# Patient Record
Sex: MALE | Race: BLACK OR AFRICAN AMERICAN | NOT HISPANIC OR LATINO | Employment: OTHER | URBAN - METROPOLITAN AREA
[De-identification: names, ages, dates, MRNs, and addresses within clinical notes are randomized per-mention and may not be internally consistent; named-entity substitution may affect disease eponyms.]

---

## 2017-03-02 ENCOUNTER — OFFICE VISIT (OUTPATIENT)
Dept: FAMILY MEDICINE | Facility: CLINIC | Age: 47
End: 2017-03-02
Payer: COMMERCIAL

## 2017-03-02 VITALS
BODY MASS INDEX: 27.43 KG/M2 | SYSTOLIC BLOOD PRESSURE: 118 MMHG | DIASTOLIC BLOOD PRESSURE: 70 MMHG | HEIGHT: 69 IN | HEART RATE: 69 BPM | TEMPERATURE: 99 F | OXYGEN SATURATION: 98 % | WEIGHT: 185.19 LBS

## 2017-03-02 DIAGNOSIS — R22.0 SCALP MASS: Primary | ICD-10-CM

## 2017-03-02 PROCEDURE — 1160F RVW MEDS BY RX/DR IN RCRD: CPT | Mod: S$GLB,,,

## 2017-03-02 PROCEDURE — 99202 OFFICE O/P NEW SF 15 MIN: CPT | Mod: S$GLB,,,

## 2017-03-02 PROCEDURE — 99999 PR PBB SHADOW E&M-NEW PATIENT-LVL III: CPT | Mod: PBBFAC,,,

## 2017-03-02 NOTE — PROGRESS NOTES
"Chief Complaint   Patient presents with    Establish Care     new to pcp       HPI  Ivanasherly Natan Sanchez is a 46 y.o. male who presents to the office to establish primary care, he also wants to be referred for a cyst/knot in the left parietal/occipital area of the scalp.  Patient is generally in very good health, he does smoke, has no other medical issues, takes no medications.      HPI    PAST MEDICAL HISTORY:  History reviewed. No pertinent past medical history.    PAST SURGICAL HISTORY:  History reviewed. No pertinent surgical history.    SOCIAL HISTORY:  Social History     Social History    Marital status:      Spouse name: N/A    Number of children: N/A    Years of education: N/A     Occupational History    Not on file.     Social History Main Topics    Smoking status: Current Every Day Smoker    Smokeless tobacco: Not on file    Alcohol use No    Drug use: Not on file    Sexual activity: Yes     Partners: Female     Other Topics Concern    Not on file     Social History Narrative    .  One child.  Smoker.  Occup:  Multi .  ETOH:  Occasionally.         FAMILY HISTORY:  Family History   Problem Relation Age of Onset    Diabetes Mother     No Known Problems Father        ALLERGIES AND MEDICATIONS: updated and reviewed.  Review of patient's allergies indicates:  No Known Allergies  No current outpatient prescriptions on file.     No current facility-administered medications for this visit.        ROS  Review of Systems   Constitutional: Negative for chills and fever.   Cardiovascular:        No exertional symptoms whatsoever.   Skin:        See history of present illness.       Physical Exam  Vitals:    03/02/17 1106   BP: 118/70   Pulse: 69   Temp: 98.7 °F (37.1 °C)    Body mass index is 27.35 kg/(m^2).  Weight: 84 kg (185 lb 3 oz)   Height: 5' 9" (175.3 cm)     Physical Exam   Constitutional: He appears well-developed and well-nourished.   HENT:   There is a fluctuant area " in the left occipitoparietal area, certainly feels cystic.  There is no drainage of material, nontender.   Psychiatric: He has a normal mood and affect. His behavior is normal.   Vitals reviewed.      Health Maintenance       Date Due Completion Date    Lipid Panel 1972 ---    TETANUS VACCINE 5/12/1990 ---    Pneumococcal PPSV23 (Medium Risk) (1) 5/12/1990 ---    Influenza Vaccine 8/1/2016 ---          Assessment & Plan    1. Scalp mass  This is been present for about 8 months.  I recommended referral to plastic surgery for definitive care.  - Ambulatory referral to Plastic Surgery      No Follow-up on file.

## 2017-03-02 NOTE — MR AVS SNAPSHOT
[facility]  [address]  [place] [address]  Phone: [phone]  Fax: [phone]                  Rigoberto Sanchez   3/2/2017 10:40 AM   Office Visit    Description:  Male : 1970   Provider:  Perry Cruz Jr., MD   Department:  [facility]           Reason for Visit     Establish Care           Diagnoses this Visit        Comments    Scalp mass    -  Primary            To Do List           Goals (5 Years of Data)     None      [facility]     [facility] On Call Nurse Care Line -  Assistance  Registered nurses in the [facility] provide clinical advisement, health education, appointment booking, and other advisory services.  Call for this free service at [phone].             Medications           Message regarding Medications     Verify the changes and/or additions to your medication regime listed below are the same as discussed with your clinician today.  If any of these changes or additions are incorrect, please notify your healthcare provider.             Verify that the below list of medications is an accurate representation of the medications you are currently taking.  If none reported, the list may be blank. If incorrect, please contact your healthcare provider. Carry this list with you in case of emergency.                Clinical Reference Information           Your Vitals Were     BP                   118/70 (BP Location: Left arm, Patient Position: Sitting, BP Method: Manual)           Blood Pressure          Most Recent Value    BP  118/70      Allergies as of 3/2/2017     No Known Allergies      Immunizations Administered on Date of Encounter - 3/2/2017     None      Orders Placed During Today's Visit      Normal Orders This Visit    Ambulatory referral to Plastic Surgery       [facility] Sign-Up     Activating your [facility] account is as easy as 1-2-3!     1) Visit [email or web address], select Sign Up Now, enter this activation  code and your date of birth, then select Next.  [email or web address]  Expires: 4/8/2017  7:46 AM      2) Create a username and password to use when you visit [facility] in the future and select a security question in case you lose your password and select Next.    3) Enter your e-mail address and click Sign Up!    Additional Information  If you have questions, please e-mail [email or web address] or call [phone] to talk to our [facility] staff. Remember, [facility] is NOT to be used for urgent needs. For medical emergencies, dial 911.         Smoking Cessation     If you would like to quit smoking:   You may be eligible for free services if you are a [place] resident and started smoking cigarettes before September 1, 1988.  Call the Smoking Cessation Trust ([facility]) toll free at ([phone] or (443) 624-8300.   Call 0-855-QUIT-NOW if you do not meet the above criteria.            Language Assistance Services     ATTENTION: Language assistance services are available, free of charge. Please call [phone].      ATENCIÓN: Si habla español, tiene a lal disposición servicios gratuitos de asistencia lingüística. Llame al [phone].     CHÚ Ý: N?u b?n nói Ti?ng Vi?t, có các d?ch v? h? tr? ngôn ng? mi?n phí dành cho b?n. G?i s? [phone].         [facility] complies with applicable Federal civil rights laws and does not discriminate on the basis of race, color, national origin, age, disability, or sex.

## 2018-04-22 ENCOUNTER — HOSPITAL ENCOUNTER (EMERGENCY)
Facility: HOSPITAL | Age: 48
Discharge: HOME | End: 2018-04-22
Attending: EMERGENCY MEDICINE
Payer: COMMERCIAL

## 2018-04-22 VITALS
HEART RATE: 56 BPM | RESPIRATION RATE: 20 BRPM | TEMPERATURE: 99 F | DIASTOLIC BLOOD PRESSURE: 74 MMHG | OXYGEN SATURATION: 96 % | SYSTOLIC BLOOD PRESSURE: 113 MMHG

## 2018-04-22 DIAGNOSIS — N23 RENAL COLIC ON LEFT SIDE: Primary | ICD-10-CM

## 2018-04-22 DIAGNOSIS — R10.9 LEFT FLANK PAIN: ICD-10-CM

## 2018-04-22 DIAGNOSIS — N20.0 KIDNEY STONE ON LEFT SIDE: ICD-10-CM

## 2018-04-22 LAB
ALBUMIN SERPL-MCNC: 4 G/DL (ref 3.3–5.5)
ALP SERPL-CCNC: 59 U/L (ref 42–141)
BILIRUB SERPL-MCNC: 0.4 MG/DL (ref 0.2–1.6)
BILIRUBIN, POC UA: NEGATIVE
BLOOD, POC UA: ABNORMAL
BUN SERPL-MCNC: 9 MG/DL (ref 7–22)
CALCIUM SERPL-MCNC: 9.7 MG/DL (ref 8–10.3)
CHLORIDE SERPL-SCNC: 103 MMOL/L (ref 98–108)
CLARITY, POC UA: ABNORMAL
COLOR, POC UA: ABNORMAL
CREAT SERPL-MCNC: 1.2 MG/DL (ref 0.6–1.2)
GLUCOSE SERPL-MCNC: 120 MG/DL (ref 73–118)
GLUCOSE, POC UA: NEGATIVE
KETONES, POC UA: NEGATIVE
LEUKOCYTE EST, POC UA: NEGATIVE
NITRITE, POC UA: NEGATIVE
PH UR STRIP: 7 [PH]
POC ALT (SGPT): 14 U/L (ref 10–47)
POC AST (SGOT): 25 U/L (ref 11–38)
POC TCO2: 24 MMOL/L (ref 18–33)
POTASSIUM BLD-SCNC: 4.1 MMOL/L (ref 3.6–5.1)
PROTEIN, POC UA: ABNORMAL
PROTEIN, POC: 7.2 G/DL (ref 6.4–8.1)
SODIUM BLD-SCNC: 143 MMOL/L (ref 128–145)
SPECIFIC GRAVITY, POC UA: 1.02
UROBILINOGEN, POC UA: 0.2 E.U./DL

## 2018-04-22 PROCEDURE — 96375 TX/PRO/DX INJ NEW DRUG ADDON: CPT

## 2018-04-22 PROCEDURE — 80053 COMPREHEN METABOLIC PANEL: CPT

## 2018-04-22 PROCEDURE — 85025 COMPLETE CBC W/AUTO DIFF WBC: CPT

## 2018-04-22 PROCEDURE — 25000003 PHARM REV CODE 250: Performed by: EMERGENCY MEDICINE

## 2018-04-22 PROCEDURE — 99284 EMERGENCY DEPT VISIT MOD MDM: CPT | Mod: 25

## 2018-04-22 PROCEDURE — 96361 HYDRATE IV INFUSION ADD-ON: CPT

## 2018-04-22 PROCEDURE — 81003 URINALYSIS AUTO W/O SCOPE: CPT

## 2018-04-22 PROCEDURE — 96374 THER/PROPH/DIAG INJ IV PUSH: CPT

## 2018-04-22 PROCEDURE — 63600175 PHARM REV CODE 636 W HCPCS: Performed by: EMERGENCY MEDICINE

## 2018-04-22 RX ORDER — OXYCODONE AND ACETAMINOPHEN 5; 325 MG/1; MG/1
1 TABLET ORAL EVERY 4 HOURS PRN
Qty: 10 TABLET | Refills: 0 | Status: SHIPPED | OUTPATIENT
Start: 2018-04-22 | End: 2018-04-24 | Stop reason: SDUPTHER

## 2018-04-22 RX ORDER — TAMSULOSIN HYDROCHLORIDE 0.4 MG/1
0.4 CAPSULE ORAL DAILY
Qty: 10 CAPSULE | Refills: 0 | Status: SHIPPED | OUTPATIENT
Start: 2018-04-22 | End: 2019-04-22

## 2018-04-22 RX ORDER — ONDANSETRON 2 MG/ML
4 INJECTION INTRAMUSCULAR; INTRAVENOUS
Status: COMPLETED | OUTPATIENT
Start: 2018-04-22 | End: 2018-04-22

## 2018-04-22 RX ORDER — TAMSULOSIN HYDROCHLORIDE 0.4 MG/1
0.4 CAPSULE ORAL
Status: COMPLETED | OUTPATIENT
Start: 2018-04-22 | End: 2018-04-22

## 2018-04-22 RX ORDER — KETOROLAC TROMETHAMINE 30 MG/ML
30 INJECTION, SOLUTION INTRAMUSCULAR; INTRAVENOUS
Status: COMPLETED | OUTPATIENT
Start: 2018-04-22 | End: 2018-04-22

## 2018-04-22 RX ORDER — HYDROMORPHONE HYDROCHLORIDE 2 MG/ML
1 INJECTION, SOLUTION INTRAMUSCULAR; INTRAVENOUS; SUBCUTANEOUS
Status: COMPLETED | OUTPATIENT
Start: 2018-04-22 | End: 2018-04-22

## 2018-04-22 RX ORDER — ONDANSETRON 4 MG/1
4 TABLET, ORALLY DISINTEGRATING ORAL EVERY 8 HOURS PRN
Qty: 14 TABLET | Refills: 1 | Status: SHIPPED | OUTPATIENT
Start: 2018-04-22 | End: 2018-04-24 | Stop reason: CLARIF

## 2018-04-22 RX ADMIN — ONDANSETRON 4 MG: 2 INJECTION INTRAMUSCULAR; INTRAVENOUS at 11:04

## 2018-04-22 RX ADMIN — KETOROLAC TROMETHAMINE 30 MG: 30 INJECTION, SOLUTION INTRAMUSCULAR at 11:04

## 2018-04-22 RX ADMIN — HYDROMORPHONE HYDROCHLORIDE 1 MG: 2 INJECTION INTRAMUSCULAR; INTRAVENOUS; SUBCUTANEOUS at 12:04

## 2018-04-22 RX ADMIN — SODIUM CHLORIDE 1000 ML: 0.9 INJECTION, SOLUTION INTRAVENOUS at 11:04

## 2018-04-22 RX ADMIN — TAMSULOSIN HYDROCHLORIDE 0.4 MG: 0.4 CAPSULE ORAL at 12:04

## 2018-04-22 NOTE — ED PROVIDER NOTES
"Encounter Date: 4/22/2018       History     Chief Complaint   Patient presents with    Flank Pain     patient reports having severe left sided flank pain X2 hours.  patient reports he has a history of kidney stones     Chief complaint: Back pain  47-year-old with a known history of kidney stones in the past complains of "pain" to his left flank area that began at 9:30 this morning and has been constant.  The intensity waxes and wanes.  Patient cannot elicit any aggravating or alleviating factors for the pain.  He rates his pain as 10 out of 10.  He does not take anything for the symptoms.  No associated symptoms such as nausea, vomiting, or urinary difficulties.      The history is provided by the patient and the spouse.     Review of patient's allergies indicates:  No Known Allergies  No past medical history on file.  No past surgical history on file.  Family History   Problem Relation Age of Onset    Diabetes Mother     No Known Problems Father      Social History   Substance Use Topics    Smoking status: Current Every Day Smoker    Smokeless tobacco: Not on file    Alcohol use No     Review of Systems   Constitutional: Negative for fever.   Respiratory: Negative for shortness of breath.    Cardiovascular: Negative for chest pain.   Gastrointestinal: Negative for nausea and vomiting.   Genitourinary: Positive for flank pain. Negative for decreased urine volume.   Musculoskeletal: Positive for back pain.   Neurological: Positive for dizziness and weakness.   All other systems reviewed and are negative.      Physical Exam     Initial Vitals [04/22/18 1101]   BP Pulse Resp Temp SpO2   (!) 157/95 70 20 98.7 °F (37.1 °C) 100 %      MAP       115.67         Physical Exam    Constitutional: He appears well-developed and well-nourished. He appears distressed.   HENT:   Head: Normocephalic and atraumatic.   Eyes: EOM are normal. Pupils are equal, round, and reactive to light.   Neck: Neck supple.   Cardiovascular: " Normal rate, regular rhythm and normal heart sounds.   Pulmonary/Chest: Breath sounds normal.   Abdominal: Soft. There is no tenderness. There is no rebound and no guarding.   Musculoskeletal: Normal range of motion.   Neurological: He is alert and oriented to person, place, and time. No cranial nerve deficit.   Skin: Skin is warm and dry.   Psychiatric: He has a normal mood and affect.         ED Course   Procedures  Labs Reviewed   POCT URINALYSIS W/O SCOPE   POCT URINALYSIS W/O SCOPE   POCT CBC   POCT CMP             Medical Decision Making:   Initial Assessment:   47-year-old with a known history of kidney stones presents with pain to his left flank which is similar to the pain past.  On exam patient appears in moderate distress.  He has no CVA tenderness or abdominal tenderness.  His blood pressure is elevated.  ED Management:  Patient was given Toradol with some improvement of his pain.  The pain returned so he was given hydromorphone.  Patient is also given IV fluids and Flomax.  CT of the abdomen and pelvis was ordered.  Labs showed no significant abnormalities including normal white blood cell count  And normal kidney function.    Patient CT does show a  2 millimeter proximal left kidney stone.  There is no infection in the urine.  Patient's pain resolved after Toradol and Dilaudid.  He was also given a Flomax.  Patient was discharged on Percocet, Flomax and Zofran.  He will be referred to urology and was given a urine strainer.                      Clinical Impression:   The primary encounter diagnosis was Renal colic on left side. Diagnoses of Left flank pain and Kidney stone on left side were also pertinent to this visit.                           Alyssia Fink MD  04/22/18 5326

## 2018-04-24 ENCOUNTER — OFFICE VISIT (OUTPATIENT)
Dept: UROLOGY | Facility: CLINIC | Age: 48
End: 2018-04-24
Payer: COMMERCIAL

## 2018-04-24 VITALS
SYSTOLIC BLOOD PRESSURE: 112 MMHG | BODY MASS INDEX: 27.49 KG/M2 | WEIGHT: 185.63 LBS | RESPIRATION RATE: 14 BRPM | HEART RATE: 68 BPM | DIASTOLIC BLOOD PRESSURE: 62 MMHG | HEIGHT: 69 IN

## 2018-04-24 DIAGNOSIS — N13.2 HYDRONEPHROSIS WITH URINARY OBSTRUCTION DUE TO URETERAL CALCULUS: ICD-10-CM

## 2018-04-24 DIAGNOSIS — N20.0 NEPHROLITHIASIS: Primary | ICD-10-CM

## 2018-04-24 PROCEDURE — 99244 OFF/OP CNSLTJ NEW/EST MOD 40: CPT | Mod: S$GLB,,, | Performed by: UROLOGY

## 2018-04-24 PROCEDURE — 99999 PR PBB SHADOW E&M-EST. PATIENT-LVL III: CPT | Mod: PBBFAC,,, | Performed by: UROLOGY

## 2018-04-24 RX ORDER — ONDANSETRON 8 MG/1
TABLET, ORALLY DISINTEGRATING ORAL
Refills: 1 | COMMUNITY
Start: 2018-04-22

## 2018-04-24 RX ORDER — OXYCODONE AND ACETAMINOPHEN 5; 325 MG/1; MG/1
1 TABLET ORAL EVERY 4 HOURS PRN
Qty: 25 TABLET | Refills: 0 | Status: SHIPPED | OUTPATIENT
Start: 2018-04-24

## 2018-04-24 NOTE — PROGRESS NOTES
"Subjective:       Rigoberto Sanchez is a 47 y.o. male who is a new patient who was referred by Dr Fink (ED) for evaluation of nephrolithiasis.      He was seen in ED (4/22/18) with c/o L flank pain. He has h/o kidney stones - passed two stones previously. Last stone was 7-8 years ago, prior to that 20 years. CT showed a 2mm L proximal ureteral stone, also small b/l non-obstructing stones. UA was clear at that time. +n/v at that time.     He continues to have pain. Taking Percocet with relief. No further n/v. Pain in same location.       The following portions of the patient's history were reviewed and updated as appropriate: allergies, current medications, past family history, past medical history, past social history, past surgical history and problem list.    Review of Systems  Constitutional: no fever or chills  ENT: no nasal congestion or sore throat  Respiratory: no cough or shortness of breath  Cardiovascular: no chest pain or palpitations  Gastrointestinal: no nausea or vomiting, tolerating diet  Genitourinary: as per HPI  Hematologic/Lymphatic: no easy bruising or lymphadenopathy  Musculoskeletal: no arthralgias or myalgias  Skin: no rashes or lesions  Neurological: no seizures or tremors  Behavioral/Psych: no auditory or visual hallucinations       Objective:    Vitals: /62   Pulse 68   Resp 14   Ht 5' 9" (1.753 m)   Wt 84.2 kg (185 lb 10 oz)   BMI 27.41 kg/m²     Physical Exam   General: well developed, well nourished in no acute distress  Head: normocephalic, atraumatic  Neck: supple, trachea midline, no obvious enlargement of thyroid  HEENT: EOMI, mucus membranes moist, sclera anicteric, no hearing impairment  Lungs: symmetric expansion, non-labored breathing  Cardiovascular: regular rate and rhythm, normal pulses  Abdomen: soft, non tender, non distended, no palpable masses, no hepatosplenomegaly, no hernias, bladder nonpalpable. No CVA tenderness.  Musculoskeletal: no peripheral edema, " normal ROM in bilateral upper and lower extremities  Lymphatics: no cervical or inguinal lymphadenopathy  Skin: no rashes or lesions  Neuro: alert and oriented x 3, no gross deficits  Psych: normal judgment and insight, normal mood/affect and non-anxious  Genitourinary:   patient declined exam   SARBJIT: patient declined exam      Lab Review   Urine analysis today in clinic shows +blood 50    Lab Results   Component Value Date    WBC 9.33 06/16/2009    HGB 14.7 06/16/2009    HCT 42.5 06/16/2009    MCV 85.5 06/16/2009     06/16/2009     Lab Results   Component Value Date    CREATININE 1.2 06/16/2009    BUN 18 06/16/2009     No results found for: PSA  No results found for: PSADIAG    Imaging  CT reviewed  Reports and images were personally reviewed by me and discussed with the patient today         Assessment/Plan:      1. Nephrolithiasis    - Prior history   - 2mm L proximal ureteral stone   - Discussed red flags to go to ER: fever, inability to tolerate PO, pain not controlled by medications.   - Desires MET. Declines procedure.    - Continue Flomax   - Percocet #25 given    - RADHA in 4 weeks if stone not caught with strainer       2. Hydronephrosis with urinary obstruction due to ureteral calculus    - 2/2 2mm prox L ureteral stone       Follow up in 12 months with KUB

## 2021-05-14 ENCOUNTER — OFFICE VISIT (OUTPATIENT)
Dept: DERMATOLOGY | Facility: CLINIC | Age: 51
End: 2021-05-14
Payer: COMMERCIAL

## 2021-05-14 DIAGNOSIS — L70.0 COMEDONAL ACNE: ICD-10-CM

## 2021-05-14 DIAGNOSIS — D48.5 NEOPLASM OF UNCERTAIN BEHAVIOR OF SKIN: Primary | ICD-10-CM

## 2021-05-14 DIAGNOSIS — L72.0 EIC (EPIDERMAL INCLUSION CYST): ICD-10-CM

## 2021-05-14 DIAGNOSIS — B07.8 OTHER VIRAL WARTS: ICD-10-CM

## 2021-05-14 DIAGNOSIS — L28.0 LSC (LICHEN SIMPLEX CHRONICUS): ICD-10-CM

## 2021-05-14 DIAGNOSIS — L91.8 ST (SKIN TAG): ICD-10-CM

## 2021-05-14 PROCEDURE — 99999 PR PBB SHADOW E&M-EST. PATIENT-LVL II: ICD-10-PCS | Mod: PBBFAC,,, | Performed by: DERMATOLOGY

## 2021-05-14 PROCEDURE — 11102 TANGNTL BX SKIN SINGLE LES: CPT | Mod: S$GLB,,, | Performed by: DERMATOLOGY

## 2021-05-14 PROCEDURE — 1126F PR PAIN SEVERITY QUANTIFIED, NO PAIN PRESENT: ICD-10-PCS | Mod: S$GLB,,, | Performed by: DERMATOLOGY

## 2021-05-14 PROCEDURE — 88304 TISSUE EXAM BY PATHOLOGIST: CPT | Performed by: PATHOLOGY

## 2021-05-14 PROCEDURE — 88305 TISSUE EXAM BY PATHOLOGIST: CPT | Mod: 26,,, | Performed by: PATHOLOGY

## 2021-05-14 PROCEDURE — 99203 PR OFFICE/OUTPT VISIT, NEW, LEVL III, 30-44 MIN: ICD-10-PCS | Mod: 25,S$GLB,, | Performed by: DERMATOLOGY

## 2021-05-14 PROCEDURE — 17110 DESTRUCTION B9 LES UP TO 14: CPT | Mod: 59,S$GLB,, | Performed by: DERMATOLOGY

## 2021-05-14 PROCEDURE — 99999 PR PBB SHADOW E&M-EST. PATIENT-LVL II: CPT | Mod: PBBFAC,,, | Performed by: DERMATOLOGY

## 2021-05-14 PROCEDURE — 1126F AMNT PAIN NOTED NONE PRSNT: CPT | Mod: S$GLB,,, | Performed by: DERMATOLOGY

## 2021-05-14 PROCEDURE — 11102 PR TANGENTIAL BIOPSY, SKIN, SINGLE LESION: ICD-10-PCS | Mod: S$GLB,,, | Performed by: DERMATOLOGY

## 2021-05-14 PROCEDURE — 99203 OFFICE O/P NEW LOW 30 MIN: CPT | Mod: 25,S$GLB,, | Performed by: DERMATOLOGY

## 2021-05-14 PROCEDURE — 17110 PR DESTRUCTION BENIGN LESIONS UP TO 14: ICD-10-PCS | Mod: 59,S$GLB,, | Performed by: DERMATOLOGY

## 2021-05-14 PROCEDURE — 88305 TISSUE EXAM BY PATHOLOGIST: ICD-10-PCS | Mod: 26,,, | Performed by: PATHOLOGY

## 2021-05-14 RX ORDER — TRETINOIN 0.25 MG/G
GEL TOPICAL
Qty: 45 G | Refills: 3 | Status: SHIPPED | OUTPATIENT
Start: 2021-05-14

## 2021-05-19 LAB
FINAL PATHOLOGIC DIAGNOSIS: NORMAL
GROSS: NORMAL
Lab: NORMAL
MICROSCOPIC EXAM: NORMAL

## 2021-06-02 ENCOUNTER — TELEPHONE (OUTPATIENT)
Dept: DERMATOLOGY | Facility: CLINIC | Age: 51
End: 2021-06-02

## 2021-07-26 ENCOUNTER — TELEPHONE (OUTPATIENT)
Dept: DERMATOLOGY | Facility: CLINIC | Age: 51
End: 2021-07-26

## 2021-07-30 ENCOUNTER — PROCEDURE VISIT (OUTPATIENT)
Dept: DERMATOLOGY | Facility: CLINIC | Age: 51
End: 2021-07-30
Payer: COMMERCIAL

## 2021-07-30 DIAGNOSIS — D48.5 NEOPLASM OF UNCERTAIN BEHAVIOR OF SKIN: Primary | ICD-10-CM

## 2021-07-30 PROCEDURE — 99499 UNLISTED E&M SERVICE: CPT | Mod: S$GLB,,, | Performed by: DERMATOLOGY

## 2021-07-30 PROCEDURE — 11104 PR PUNCH BIOPSY, SKIN, SINGLE LESION: ICD-10-PCS | Mod: S$GLB,,, | Performed by: DERMATOLOGY

## 2021-07-30 PROCEDURE — 11105 PUNCH BX SKIN EA SEP/ADDL: CPT | Mod: S$GLB,,, | Performed by: DERMATOLOGY

## 2021-07-30 PROCEDURE — 88305 TISSUE EXAM BY PATHOLOGIST: CPT | Mod: 26,,, | Performed by: PATHOLOGY

## 2021-07-30 PROCEDURE — 88305 TISSUE EXAM BY PATHOLOGIST: CPT | Performed by: PATHOLOGY

## 2021-07-30 PROCEDURE — 88305 TISSUE EXAM BY PATHOLOGIST: ICD-10-PCS | Mod: 26,,, | Performed by: PATHOLOGY

## 2021-07-30 PROCEDURE — 88304 TISSUE EXAM BY PATHOLOGIST: CPT | Performed by: PATHOLOGY

## 2021-07-30 PROCEDURE — 99499 NO LOS: ICD-10-PCS | Mod: S$GLB,,, | Performed by: DERMATOLOGY

## 2021-07-30 PROCEDURE — 11104 PUNCH BX SKIN SINGLE LESION: CPT | Mod: S$GLB,,, | Performed by: DERMATOLOGY

## 2021-07-30 PROCEDURE — 11105 PR PUNCH BIOPSY, SKIN, EA ADDTL LESION: ICD-10-PCS | Mod: S$GLB,,, | Performed by: DERMATOLOGY

## 2021-08-02 ENCOUNTER — TELEPHONE (OUTPATIENT)
Dept: DERMATOLOGY | Facility: CLINIC | Age: 51
End: 2021-08-02

## 2021-08-04 LAB
FINAL PATHOLOGIC DIAGNOSIS: NORMAL
GROSS: NORMAL
Lab: NORMAL
MICROSCOPIC EXAM: NORMAL

## 2023-01-17 ENCOUNTER — OFFICE VISIT (OUTPATIENT)
Dept: UROLOGY | Facility: CLINIC | Age: 53
End: 2023-01-17
Payer: COMMERCIAL

## 2023-01-17 VITALS
HEART RATE: 103 BPM | BODY MASS INDEX: 28.85 KG/M2 | WEIGHT: 194.75 LBS | DIASTOLIC BLOOD PRESSURE: 93 MMHG | SYSTOLIC BLOOD PRESSURE: 147 MMHG | HEIGHT: 69 IN

## 2023-01-17 DIAGNOSIS — N45.1 EPIDIDYMITIS, LEFT: Primary | ICD-10-CM

## 2023-01-17 DIAGNOSIS — R86.8 DISCOLORED SEMEN: ICD-10-CM

## 2023-01-17 PROCEDURE — 3077F PR MOST RECENT SYSTOLIC BLOOD PRESSURE >= 140 MM HG: ICD-10-PCS | Mod: CPTII,S$GLB,, | Performed by: UROLOGY

## 2023-01-17 PROCEDURE — 1159F PR MEDICATION LIST DOCUMENTED IN MEDICAL RECORD: ICD-10-PCS | Mod: CPTII,S$GLB,, | Performed by: UROLOGY

## 2023-01-17 PROCEDURE — 99204 PR OFFICE/OUTPT VISIT, NEW, LEVL IV, 45-59 MIN: ICD-10-PCS | Mod: S$GLB,,, | Performed by: UROLOGY

## 2023-01-17 PROCEDURE — 3008F PR BODY MASS INDEX (BMI) DOCUMENTED: ICD-10-PCS | Mod: CPTII,S$GLB,, | Performed by: UROLOGY

## 2023-01-17 PROCEDURE — 81002 URINALYSIS NONAUTO W/O SCOPE: CPT | Mod: S$GLB,,, | Performed by: UROLOGY

## 2023-01-17 PROCEDURE — 3080F PR MOST RECENT DIASTOLIC BLOOD PRESSURE >= 90 MM HG: ICD-10-PCS | Mod: CPTII,S$GLB,, | Performed by: UROLOGY

## 2023-01-17 PROCEDURE — 3077F SYST BP >= 140 MM HG: CPT | Mod: CPTII,S$GLB,, | Performed by: UROLOGY

## 2023-01-17 PROCEDURE — 1160F RVW MEDS BY RX/DR IN RCRD: CPT | Mod: CPTII,S$GLB,, | Performed by: UROLOGY

## 2023-01-17 PROCEDURE — 1160F PR REVIEW ALL MEDS BY PRESCRIBER/CLIN PHARMACIST DOCUMENTED: ICD-10-PCS | Mod: CPTII,S$GLB,, | Performed by: UROLOGY

## 2023-01-17 PROCEDURE — 99999 PR PBB SHADOW E&M-EST. PATIENT-LVL III: CPT | Mod: PBBFAC,,, | Performed by: UROLOGY

## 2023-01-17 PROCEDURE — 1159F MED LIST DOCD IN RCRD: CPT | Mod: CPTII,S$GLB,, | Performed by: UROLOGY

## 2023-01-17 PROCEDURE — 99204 OFFICE O/P NEW MOD 45 MIN: CPT | Mod: S$GLB,,, | Performed by: UROLOGY

## 2023-01-17 PROCEDURE — 3008F BODY MASS INDEX DOCD: CPT | Mod: CPTII,S$GLB,, | Performed by: UROLOGY

## 2023-01-17 PROCEDURE — 3080F DIAST BP >= 90 MM HG: CPT | Mod: CPTII,S$GLB,, | Performed by: UROLOGY

## 2023-01-17 PROCEDURE — 81002 POCT URINE DIPSTICK WITHOUT MICROSCOPE: ICD-10-PCS | Mod: S$GLB,,, | Performed by: UROLOGY

## 2023-01-17 PROCEDURE — 87086 URINE CULTURE/COLONY COUNT: CPT | Performed by: UROLOGY

## 2023-01-17 PROCEDURE — 99999 PR PBB SHADOW E&M-EST. PATIENT-LVL III: ICD-10-PCS | Mod: PBBFAC,,, | Performed by: UROLOGY

## 2023-01-17 RX ORDER — SULFAMETHOXAZOLE AND TRIMETHOPRIM 800; 160 MG/1; MG/1
1 TABLET ORAL 2 TIMES DAILY
Qty: 28 TABLET | Refills: 0 | Status: SHIPPED | OUTPATIENT
Start: 2023-01-17 | End: 2023-01-31

## 2023-01-17 NOTE — PROGRESS NOTES
Subjective:       Patient ID: Rigoberto Sanchez is a 52 y.o. male.    Chief Complaint: sprem is brown     This is a 52 y.o.  male patient that is new to me.  The patient was self referred left testicular pain and swelling, discolored semen.  Patient reports having pain in the left testicle with swelling for 4 days.  Gradually worsening.  Some alleviation with ibuprofen, exacerbation with activity and touching area.  Note subjective low-grade fever.  No dysuria or urinary symptoms.  Urinalysis with some blood and leukocytes.  He also notes discoloration of a brownish color to his semen recently.      Lab Results   Component Value Date    CREATININE 1.2 06/16/2009       ---  PMH/PSH/Medications/Allergies/Social history reviewed and as in chart.    Review of Systems   Constitutional:  Negative for activity change, chills and fever.   HENT:  Negative for congestion.    Respiratory:  Negative for cough, chest tightness and shortness of breath.    Cardiovascular:  Negative for chest pain and palpitations.   Gastrointestinal:  Negative for abdominal distention, abdominal pain, nausea and vomiting.   Genitourinary:  Positive for scrotal swelling and testicular pain. Negative for difficulty urinating, flank pain, hematuria and penile pain.   Musculoskeletal:  Negative for gait problem.     Objective:      Physical Exam  Constitutional:       Appearance: Normal appearance.   HENT:      Head: Normocephalic.   Pulmonary:      Effort: Pulmonary effort is normal.      Breath sounds: Normal breath sounds.   Abdominal:      General: Abdomen is flat. Bowel sounds are normal.      Palpations: Abdomen is soft.      Tenderness: There is no abdominal tenderness. There is no right CVA tenderness, left CVA tenderness or guarding.   Genitourinary:     Comments: Left scrotal swelling, firmness, tenderness.  Some induration but no fluctuance.  Consistent with left epididymitis.  Musculoskeletal:         General: Normal range of motion.       Cervical back: Normal range of motion.   Skin:     General: Skin is warm.   Neurological:      Mental Status: He is alert.       Assessment:     Problem Noted   Epididymitis, Left 1/17/2023   Discolored Semen 1/17/2023       Plan:     Discussed symptoms and exam consistent with left epididymal orchitis.  Will treat with antibiotics for 2 weeks.  Urinalysis with microscopy, urine culture.  Reassurance given regarding discolored semen.    Tate West MD    The above referenced imaging and interpretations were personally reviewed.  Disclaimer: This note has been generated using voice-recognition software. There may be typographical errors that have been missed during proof-reading.

## 2023-01-18 DIAGNOSIS — N45.1 EPIDIDYMITIS, LEFT: Primary | ICD-10-CM

## 2023-01-18 LAB
BILIRUB SERPL-MCNC: NEGATIVE MG/DL
BLOOD URINE, POC: 250
CLARITY, POC UA: CLEAR
COLOR, POC UA: YELLOW
GLUCOSE UR QL STRIP: NORMAL
KETONES UR QL STRIP: NEGATIVE
LEUKOCYTE ESTERASE URINE, POC: ABNORMAL
NITRITE, POC UA: NEGATIVE
PH, POC UA: 5
PROTEIN, POC: 100
SPECIFIC GRAVITY, POC UA: 1.02
UROBILINOGEN, POC UA: NORMAL

## 2023-01-19 ENCOUNTER — LAB VISIT (OUTPATIENT)
Dept: LAB | Facility: HOSPITAL | Age: 53
End: 2023-01-19
Attending: UROLOGY
Payer: COMMERCIAL

## 2023-01-19 DIAGNOSIS — N45.1 EPIDIDYMITIS, LEFT: ICD-10-CM

## 2023-01-19 LAB — BACTERIA UR CULT: NO GROWTH

## 2023-01-19 PROCEDURE — 81001 URINALYSIS AUTO W/SCOPE: CPT | Performed by: UROLOGY

## 2023-01-19 PROCEDURE — 87086 URINE CULTURE/COLONY COUNT: CPT | Performed by: UROLOGY

## 2023-01-20 LAB
BACTERIA #/AREA URNS AUTO: ABNORMAL /HPF
BACTERIA UR CULT: NO GROWTH
MICROSCOPIC COMMENT: ABNORMAL
RBC #/AREA URNS AUTO: 5 /HPF (ref 0–4)
WBC #/AREA URNS AUTO: 20 /HPF (ref 0–5)

## 2023-01-31 ENCOUNTER — HOSPITAL ENCOUNTER (OUTPATIENT)
Dept: RADIOLOGY | Facility: HOSPITAL | Age: 53
Discharge: HOME | End: 2023-01-31
Attending: UROLOGY
Payer: COMMERCIAL

## 2023-01-31 DIAGNOSIS — N45.1 EPIDIDYMITIS, LEFT: ICD-10-CM

## 2023-01-31 PROCEDURE — 76870 US EXAM SCROTUM: CPT | Mod: 26,,, | Performed by: STUDENT IN AN ORGANIZED HEALTH CARE EDUCATION/TRAINING PROGRAM

## 2023-01-31 PROCEDURE — 76870 US SCROTUM AND TESTICLES: ICD-10-PCS | Mod: 26,,, | Performed by: STUDENT IN AN ORGANIZED HEALTH CARE EDUCATION/TRAINING PROGRAM

## 2023-01-31 PROCEDURE — 76870 US EXAM SCROTUM: CPT | Mod: TC

## 2023-01-31 NOTE — PROGRESS NOTES
Subjective:       Patient ID: Rigoberto Sanchez is a 52 y.o. male.    Chief Complaint: Follow-up (Imaging results)     This is a 52 y.o.  male patient twith left testicular pain and swelling, discolored semen.  Patient reports having pain in the left testicle for days intitially.  Gradually worsening.  Some alleviation with ibuprofen, exacerbation with activity and touching area.  Notes subjective low-grade fever.  No dysuria or urinary symptoms.  Urinalysis with some blood and leukocytes.  He also notes discoloration of a brownish color to his semen recently.  Dx epididymitis and given antibiotic   SHAYNA 1/23: left epididymitis.    Notes significant improvement after antibiotic still some mild tenderness.        Lab Results   Component Value Date    CREATININE 1.2 06/16/2009       ---  PMH/PSH/Medications/Allergies/Social history reviewed and as in chart.    Review of Systems   Constitutional:  Negative for activity change, chills and fever.   HENT:  Negative for congestion.    Respiratory:  Negative for cough, chest tightness and shortness of breath.    Cardiovascular:  Negative for chest pain and palpitations.   Gastrointestinal:  Negative for abdominal distention, abdominal pain, nausea and vomiting.   Genitourinary:  Positive for scrotal swelling and testicular pain. Negative for difficulty urinating, flank pain, hematuria and penile pain.   Musculoskeletal:  Negative for gait problem.     Objective:      Physical Exam  Constitutional:       Appearance: Normal appearance.   HENT:      Head: Normocephalic.   Pulmonary:      Effort: Pulmonary effort is normal.      Breath sounds: Normal breath sounds.   Abdominal:      General: Abdomen is flat. Bowel sounds are normal.      Palpations: Abdomen is soft.      Tenderness: There is no abdominal tenderness. There is no right CVA tenderness, left CVA tenderness or guarding.   Genitourinary:     Comments: Left epididymitis much improved still some mild swelling and  tenderness, no fluctuance or abscess.  Small reactive hydrocele  Musculoskeletal:         General: Normal range of motion.      Cervical back: Normal range of motion.   Skin:     General: Skin is warm.   Neurological:      Mental Status: He is alert.         SHAYNA: left epididymitis, small right epididymal head cyst    Assessment:     Problem Noted   Epididymitis, Left 1/17/2023    SHAYNA confirms 1/23, increased vascularity.   Treated with bactrim x 2 weeks.      Discolored Semen 1/17/2023       Plan:     SHAYNA reviewed, left persistent epidiymitis   Antibiotic for 1 more week  Follow up in 6 months, sooner if does not resolve  PRN ibuprofen    Tate West MD    The above referenced imaging and interpretations were personally reviewed.  Disclaimer: This note has been generated using voice-recognition software. There may be typographical errors that have been missed during proof-reading.

## 2023-02-01 ENCOUNTER — OFFICE VISIT (OUTPATIENT)
Dept: UROLOGY | Facility: CLINIC | Age: 53
End: 2023-02-01
Payer: COMMERCIAL

## 2023-02-01 VITALS
HEART RATE: 76 BPM | WEIGHT: 197.63 LBS | HEIGHT: 69 IN | DIASTOLIC BLOOD PRESSURE: 87 MMHG | BODY MASS INDEX: 29.27 KG/M2 | SYSTOLIC BLOOD PRESSURE: 147 MMHG

## 2023-02-01 DIAGNOSIS — N45.1 EPIDIDYMITIS, LEFT: ICD-10-CM

## 2023-02-01 PROCEDURE — 1160F RVW MEDS BY RX/DR IN RCRD: CPT | Mod: CPTII,S$GLB,, | Performed by: UROLOGY

## 2023-02-01 PROCEDURE — 3079F PR MOST RECENT DIASTOLIC BLOOD PRESSURE 80-89 MM HG: ICD-10-PCS | Mod: CPTII,S$GLB,, | Performed by: UROLOGY

## 2023-02-01 PROCEDURE — 3077F SYST BP >= 140 MM HG: CPT | Mod: CPTII,S$GLB,, | Performed by: UROLOGY

## 2023-02-01 PROCEDURE — 3008F PR BODY MASS INDEX (BMI) DOCUMENTED: ICD-10-PCS | Mod: CPTII,S$GLB,, | Performed by: UROLOGY

## 2023-02-01 PROCEDURE — 99213 OFFICE O/P EST LOW 20 MIN: CPT | Mod: S$GLB,,, | Performed by: UROLOGY

## 2023-02-01 PROCEDURE — 3079F DIAST BP 80-89 MM HG: CPT | Mod: CPTII,S$GLB,, | Performed by: UROLOGY

## 2023-02-01 PROCEDURE — 99999 PR PBB SHADOW E&M-EST. PATIENT-LVL III: ICD-10-PCS | Mod: PBBFAC,,, | Performed by: UROLOGY

## 2023-02-01 PROCEDURE — 99999 PR PBB SHADOW E&M-EST. PATIENT-LVL III: CPT | Mod: PBBFAC,,, | Performed by: UROLOGY

## 2023-02-01 PROCEDURE — 1160F PR REVIEW ALL MEDS BY PRESCRIBER/CLIN PHARMACIST DOCUMENTED: ICD-10-PCS | Mod: CPTII,S$GLB,, | Performed by: UROLOGY

## 2023-02-01 PROCEDURE — 3077F PR MOST RECENT SYSTOLIC BLOOD PRESSURE >= 140 MM HG: ICD-10-PCS | Mod: CPTII,S$GLB,, | Performed by: UROLOGY

## 2023-02-01 PROCEDURE — 1159F PR MEDICATION LIST DOCUMENTED IN MEDICAL RECORD: ICD-10-PCS | Mod: CPTII,S$GLB,, | Performed by: UROLOGY

## 2023-02-01 PROCEDURE — 99213 PR OFFICE/OUTPT VISIT, EST, LEVL III, 20-29 MIN: ICD-10-PCS | Mod: S$GLB,,, | Performed by: UROLOGY

## 2023-02-01 PROCEDURE — 3008F BODY MASS INDEX DOCD: CPT | Mod: CPTII,S$GLB,, | Performed by: UROLOGY

## 2023-02-01 PROCEDURE — 1159F MED LIST DOCD IN RCRD: CPT | Mod: CPTII,S$GLB,, | Performed by: UROLOGY

## 2023-02-01 RX ORDER — SULFAMETHOXAZOLE AND TRIMETHOPRIM 800; 160 MG/1; MG/1
1 TABLET ORAL 2 TIMES DAILY
Qty: 14 TABLET | Refills: 0 | Status: SHIPPED | OUTPATIENT
Start: 2023-02-01 | End: 2023-02-08

## 2023-02-23 ENCOUNTER — TELEPHONE (OUTPATIENT)
Dept: UROLOGY | Facility: CLINIC | Age: 53
End: 2023-02-23
Payer: COMMERCIAL

## 2023-02-23 NOTE — TELEPHONE ENCOUNTER
Spoke with patient's wife, Nisha.  Patient would like to discuss a possible vasectomy.  Appointment offered an accepted to discuss with MD.   no

## 2023-02-23 NOTE — TELEPHONE ENCOUNTER
----- Message from Christin Charles sent at 2/23/2023  8:11 AM CST -----  Type:  Needs Medical Advice    Who Called:  pt's wife Tory  Symptoms (please be specific):    How long has patient had these symptoms:    Pharmacy name and phone #:    Would the patient rather a call back or a response via [facility]? Call   Best Call Back Number: [phone]  Additional Information: wants to speak to office and proceeding on with procedure for vasectomy. Would like next Friday or any Friday or any other day is fine.

## 2023-10-09 DIAGNOSIS — M25.461 PAIN AND SWELLING OF RIGHT KNEE: Primary | ICD-10-CM

## 2023-10-09 DIAGNOSIS — M25.561 PAIN AND SWELLING OF RIGHT KNEE: Primary | ICD-10-CM

## 2023-10-10 ENCOUNTER — HOSPITAL ENCOUNTER (OUTPATIENT)
Dept: RADIOLOGY | Facility: HOSPITAL | Age: 53
Discharge: HOME | End: 2023-10-10
Attending: ORTHOPAEDIC SURGERY
Payer: COMMERCIAL

## 2023-10-10 ENCOUNTER — OFFICE VISIT (OUTPATIENT)
Dept: ORTHOPEDICS | Facility: CLINIC | Age: 53
End: 2023-10-10
Payer: COMMERCIAL

## 2023-10-10 VITALS — HEIGHT: 69 IN | BODY MASS INDEX: 28.44 KG/M2 | WEIGHT: 192 LBS

## 2023-10-10 DIAGNOSIS — M25.561 PAIN AND SWELLING OF RIGHT KNEE: Primary | ICD-10-CM

## 2023-10-10 DIAGNOSIS — M25.461 PAIN AND SWELLING OF RIGHT KNEE: ICD-10-CM

## 2023-10-10 DIAGNOSIS — M25.461 PAIN AND SWELLING OF RIGHT KNEE: Primary | ICD-10-CM

## 2023-10-10 DIAGNOSIS — M17.11 PRIMARY OSTEOARTHRITIS OF RIGHT KNEE: ICD-10-CM

## 2023-10-10 DIAGNOSIS — M25.561 PAIN AND SWELLING OF RIGHT KNEE: ICD-10-CM

## 2023-10-10 PROCEDURE — 1159F PR MEDICATION LIST DOCUMENTED IN MEDICAL RECORD: ICD-10-PCS | Mod: CPTII,S$GLB,, | Performed by: ORTHOPAEDIC SURGERY

## 2023-10-10 PROCEDURE — 73564 XR KNEE ORTHO RIGHT WITH FLEXION: ICD-10-PCS | Mod: 26,RT,, | Performed by: RADIOLOGY

## 2023-10-10 PROCEDURE — 3008F PR BODY MASS INDEX (BMI) DOCUMENTED: ICD-10-PCS | Mod: CPTII,S$GLB,, | Performed by: ORTHOPAEDIC SURGERY

## 2023-10-10 PROCEDURE — 99203 PR OFFICE/OUTPT VISIT, NEW, LEVL III, 30-44 MIN: ICD-10-PCS | Mod: S$GLB,,, | Performed by: ORTHOPAEDIC SURGERY

## 2023-10-10 PROCEDURE — 99999 PR PBB SHADOW E&M-EST. PATIENT-LVL III: ICD-10-PCS | Mod: PBBFAC,,, | Performed by: ORTHOPAEDIC SURGERY

## 2023-10-10 PROCEDURE — 73564 X-RAY EXAM KNEE 4 OR MORE: CPT | Mod: 26,RT,, | Performed by: RADIOLOGY

## 2023-10-10 PROCEDURE — 99999 PR PBB SHADOW E&M-EST. PATIENT-LVL III: CPT | Mod: PBBFAC,,, | Performed by: ORTHOPAEDIC SURGERY

## 2023-10-10 PROCEDURE — 73564 X-RAY EXAM KNEE 4 OR MORE: CPT | Mod: TC,RT

## 2023-10-10 PROCEDURE — 1160F PR REVIEW ALL MEDS BY PRESCRIBER/CLIN PHARMACIST DOCUMENTED: ICD-10-PCS | Mod: CPTII,S$GLB,, | Performed by: ORTHOPAEDIC SURGERY

## 2023-10-10 PROCEDURE — 73562 XR KNEE ORTHO RIGHT WITH FLEXION: ICD-10-PCS | Mod: 26,LT,, | Performed by: RADIOLOGY

## 2023-10-10 PROCEDURE — 1159F MED LIST DOCD IN RCRD: CPT | Mod: CPTII,S$GLB,, | Performed by: ORTHOPAEDIC SURGERY

## 2023-10-10 PROCEDURE — 73562 X-RAY EXAM OF KNEE 3: CPT | Mod: 26,LT,, | Performed by: RADIOLOGY

## 2023-10-10 PROCEDURE — 99203 OFFICE O/P NEW LOW 30 MIN: CPT | Mod: S$GLB,,, | Performed by: ORTHOPAEDIC SURGERY

## 2023-10-10 PROCEDURE — 1160F RVW MEDS BY RX/DR IN RCRD: CPT | Mod: CPTII,S$GLB,, | Performed by: ORTHOPAEDIC SURGERY

## 2023-10-10 PROCEDURE — 3008F BODY MASS INDEX DOCD: CPT | Mod: CPTII,S$GLB,, | Performed by: ORTHOPAEDIC SURGERY

## 2023-10-10 RX ORDER — MELOXICAM 15 MG/1
15 TABLET ORAL DAILY
Qty: 30 TABLET | Refills: 1 | Status: SHIPPED | OUTPATIENT
Start: 2023-10-10

## 2023-10-15 PROBLEM — M17.11 PRIMARY OSTEOARTHRITIS OF RIGHT KNEE: Status: ACTIVE | Noted: 2023-10-15

## 2023-10-15 NOTE — PROGRESS NOTES
"Subjective:       Patient ID: Rigoberto Sanchez is a 53 y.o. male.    Chief Complaint:   Swelling of the Right Knee  He comes in for swelling in the right knee.  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  He had this last season as well, but it is persisting into this season and he wanted to get checked.  No significant accident, injury, history of pertinent surgery involving the knee.  The right knee swells after he is on it a lot, such as 6 hours on the field last Saturday.  No groin pain, distal numbness or tingling    Past Medical History:   Diagnosis Date    Kidney stone      No past surgical history on file.  Family History   Problem Relation Age of Onset    Diabetes Mother     No Known Problems Father      Social History     Socioeconomic History    Marital status:    Tobacco Use    Smoking status: Every Day     Types: Cigars   Substance and Sexual Activity    Alcohol use: No    Sexual activity: Yes     Partners: Female   Social History Narrative    .  One child.  Smoker.  Occup:  Multi .  ETOH:  Occasionally.         Current Outpatient Medications   Medication Sig Dispense Refill    ondansetron (ZOFRAN-ODT) 8 MG TbDL DISSOLVE 1 T PO Q 8 H PRN  1    oxyCODONE-acetaminophen (PERCOCET) 5-325 mg per tablet Take 1 tablet by mouth every 4 (four) hours as needed for Pain. 25 tablet 0    tretinoin (RETIN-A) 0.025 % gel Apply small amount to face qhs. Wash off qam and apply sunscreen. 45 g 3    meloxicam (MOBIC) 15 MG tablet Take 1 tablet (15 mg total) by mouth once daily. 30 tablet 1    tamsulosin (FLOMAX) 0.4 mg Cp24 Take 1 capsule (0.4 mg total) by mouth once daily. 10 capsule 0     No current facility-administered medications for this visit.     Review of patient's allergies indicates:  No Known Allergies      Objective:      Vitals:    10/10/23 0813   Weight: 87.1 kg (192 lb)   Height: 5' 9" (1.753 m) "     Physical Exam  Right knee:  There is no significant coronal plane deformity, and a normal walking gait.  Active range of motion is 0-120 degrees, tight in full extension.  He has a small effusion..  Anterior crepitus with active extension.  Patellar mobility is mildly limited.  There is no point tenderness.  No instability to varus/valgus/Lachman's stressing.  No pain in the groin with flexion and internal rotation of the hip which is not limited.  Skin intact.  Distal neurovascular intact.  Flip test negative.    Imaging Review:   X-rays show minimal medial gonarthrosis without any acute findings or suspicious bone defects  Assessment:   Early DJD, with synovitis  Plan:       He has no mechanism of injury or mechanical symptoms.  I see no surgical indication.  I am going to refer him to Dr. Vaughn in non-operative sports medicine for further evaluation and ongoing management.  The patient is prescribed meloxicam for short-term daily use, with intermittent as-needed use over the longer term.   Appropriate cautions are provided regarding possible GI and renal adverse effects.  The synergy of meloxicam and acetaminophen is described, and Tylenol Arthritis or the generic equivalent is recommended, as often as every 8 hours.  The patient is cautioned to avoid exceeding 3000 mg of Tylenol every 24 hours.  The record is reviewed regarding history renal or hepatic dysfunction, history of GI disease.    The patient's pathophysiology was explained in detail with reference to x-rays, models, other visual aids as appropriate.  Treatment options were discussed in detail.  Questions were invited and answered to the patient's satisfaction.    South Altamirano MD  Orthopaedic Surgery

## 2023-11-02 ENCOUNTER — OFFICE VISIT (OUTPATIENT)
Dept: SPORTS MEDICINE | Facility: CLINIC | Age: 53
End: 2023-11-02
Payer: COMMERCIAL

## 2023-11-02 VITALS
WEIGHT: 198.44 LBS | DIASTOLIC BLOOD PRESSURE: 85 MMHG | SYSTOLIC BLOOD PRESSURE: 139 MMHG | BODY MASS INDEX: 29.39 KG/M2 | HEART RATE: 60 BPM | HEIGHT: 69 IN

## 2023-11-02 DIAGNOSIS — M17.11 PRIMARY OSTEOARTHRITIS OF RIGHT KNEE: ICD-10-CM

## 2023-11-02 DIAGNOSIS — M25.461 EFFUSION OF BURSA OF RIGHT KNEE: ICD-10-CM

## 2023-11-02 DIAGNOSIS — M25.561 CHRONIC PAIN OF RIGHT KNEE: Primary | ICD-10-CM

## 2023-11-02 DIAGNOSIS — G89.29 CHRONIC PAIN OF RIGHT KNEE: Primary | ICD-10-CM

## 2023-11-02 PROCEDURE — 99204 OFFICE O/P NEW MOD 45 MIN: CPT | Mod: S$GLB,,, | Performed by: STUDENT IN AN ORGANIZED HEALTH CARE EDUCATION/TRAINING PROGRAM

## 2023-11-02 PROCEDURE — 3079F DIAST BP 80-89 MM HG: CPT | Mod: CPTII,S$GLB,, | Performed by: STUDENT IN AN ORGANIZED HEALTH CARE EDUCATION/TRAINING PROGRAM

## 2023-11-02 PROCEDURE — 3079F PR MOST RECENT DIASTOLIC BLOOD PRESSURE 80-89 MM HG: ICD-10-PCS | Mod: CPTII,S$GLB,, | Performed by: STUDENT IN AN ORGANIZED HEALTH CARE EDUCATION/TRAINING PROGRAM

## 2023-11-02 PROCEDURE — 1159F MED LIST DOCD IN RCRD: CPT | Mod: CPTII,S$GLB,, | Performed by: STUDENT IN AN ORGANIZED HEALTH CARE EDUCATION/TRAINING PROGRAM

## 2023-11-02 PROCEDURE — 3008F BODY MASS INDEX DOCD: CPT | Mod: CPTII,S$GLB,, | Performed by: STUDENT IN AN ORGANIZED HEALTH CARE EDUCATION/TRAINING PROGRAM

## 2023-11-02 PROCEDURE — 3075F PR MOST RECENT SYSTOLIC BLOOD PRESS GE 130-139MM HG: ICD-10-PCS | Mod: CPTII,S$GLB,, | Performed by: STUDENT IN AN ORGANIZED HEALTH CARE EDUCATION/TRAINING PROGRAM

## 2023-11-02 PROCEDURE — 99999 PR PBB SHADOW E&M-EST. PATIENT-LVL III: CPT | Mod: PBBFAC,,, | Performed by: STUDENT IN AN ORGANIZED HEALTH CARE EDUCATION/TRAINING PROGRAM

## 2023-11-02 PROCEDURE — 99999 PR PBB SHADOW E&M-EST. PATIENT-LVL III: ICD-10-PCS | Mod: PBBFAC,,, | Performed by: STUDENT IN AN ORGANIZED HEALTH CARE EDUCATION/TRAINING PROGRAM

## 2023-11-02 PROCEDURE — 1160F PR REVIEW ALL MEDS BY PRESCRIBER/CLIN PHARMACIST DOCUMENTED: ICD-10-PCS | Mod: CPTII,S$GLB,, | Performed by: STUDENT IN AN ORGANIZED HEALTH CARE EDUCATION/TRAINING PROGRAM

## 2023-11-02 PROCEDURE — 99204 PR OFFICE/OUTPT VISIT, NEW, LEVL IV, 45-59 MIN: ICD-10-PCS | Mod: S$GLB,,, | Performed by: STUDENT IN AN ORGANIZED HEALTH CARE EDUCATION/TRAINING PROGRAM

## 2023-11-02 PROCEDURE — 3008F PR BODY MASS INDEX (BMI) DOCUMENTED: ICD-10-PCS | Mod: CPTII,S$GLB,, | Performed by: STUDENT IN AN ORGANIZED HEALTH CARE EDUCATION/TRAINING PROGRAM

## 2023-11-02 PROCEDURE — 1160F RVW MEDS BY RX/DR IN RCRD: CPT | Mod: CPTII,S$GLB,, | Performed by: STUDENT IN AN ORGANIZED HEALTH CARE EDUCATION/TRAINING PROGRAM

## 2023-11-02 PROCEDURE — 1159F PR MEDICATION LIST DOCUMENTED IN MEDICAL RECORD: ICD-10-PCS | Mod: CPTII,S$GLB,, | Performed by: STUDENT IN AN ORGANIZED HEALTH CARE EDUCATION/TRAINING PROGRAM

## 2023-11-02 PROCEDURE — 3075F SYST BP GE 130 - 139MM HG: CPT | Mod: CPTII,S$GLB,, | Performed by: STUDENT IN AN ORGANIZED HEALTH CARE EDUCATION/TRAINING PROGRAM

## 2023-11-02 NOTE — PROGRESS NOTES
CC: right knee pain    53 y.o. Male presents today for evaluation of his right knee pain. Pt was referred by Dr. Altamirano. Pt reports gradual onset of knee pain since Aug '23. Pt reports intermittent swelling and difficulty with knee flexion. Pt localizes pain and swelling to anteromedial, anterolateral and posterior knee. Pt reports pain is 1/10 today. Pt reports popping in the knees. Pt denies numbness/tingling.     SYMPTOMS:   Pain Score: 1/10   Pain location: anteromedial, anterolateral and posterior knee  Time of onset: Aug '23  Trauma, injury: gradual onset    Audible pop: yes  Clicking: no  Catching: no  Locking: no  Giving out, instability: yes  Swelling: yes  Theater sign: yes (after prolonged activity)  Problems with stairs: yes (after prolonged activity)    INTERVENTIONS:   Medications tried: ibuprofen 800mg (mild relief)  Braces/devices: compression sleeve  Physical therapy: none  Injections: none    RELEVANT HISTORY:   Imaging to date: 10/10/23  Previous significant knee injuries: none  Previous knee surgeries: none     Occupation: Referee    REVIEW OF SYSTEMS:   Constitution: Patient denies fever or chills.  Eyes: Patient denies eye pain or vision changes.  HEENT: Patient denies ear pain, sore throat, or nasal discharge.  CVS: Patient denies chest pain.  Lungs: Patient denies shortness of breath or cough.  Abdomen: Patient denies any stomach pain, nausea, vomiting, or diarrhea  Skin: Patient denies skin rash or itching.    Musculoskeletal: Patient reports heel pain after running.   Neuro: Patient denies any numbness or tingling in upper or lower extremities.  Psych: Patient denies any current anxiety or nervousness.    PAST MEDICAL HISTORY:   Past Medical History:   Diagnosis Date    Kidney stone        PAST SURGICAL HISTORY:  History reviewed. No pertinent surgical history.    FAMILY HISTORY:  Family History   Problem Relation Age of Onset    Diabetes Mother     No Known Problems Father        SOCIAL  HISTORY:  Social History     Socioeconomic History    Marital status:    Tobacco Use    Smoking status: Every Day     Types: Cigars   Substance and Sexual Activity    Alcohol use: No    Sexual activity: Yes     Partners: Female   Social History Narrative    .  One child.  Smoker.  Occup:  Multi .  ETOH:  Occasionally.         MEDICATIONS:     Current Outpatient Medications:     meloxicam (MOBIC) 15 MG tablet, Take 1 tablet (15 mg total) by mouth once daily., Disp: 30 tablet, Rfl: 1    ondansetron (ZOFRAN-ODT) 8 MG TbDL, DISSOLVE 1 T PO Q 8 H PRN, Disp: , Rfl: 1    oxyCODONE-acetaminophen (PERCOCET) 5-325 mg per tablet, Take 1 tablet by mouth every 4 (four) hours as needed for Pain. (Patient not taking: Reported on 11/2/2023), Disp: 25 tablet, Rfl: 0    tamsulosin (FLOMAX) 0.4 mg Cp24, Take 1 capsule (0.4 mg total) by mouth once daily., Disp: 10 capsule, Rfl: 0    tretinoin (RETIN-A) 0.025 % gel, Apply small amount to face qhs. Wash off qam and apply sunscreen. (Patient not taking: Reported on 11/2/2023), Disp: 45 g, Rfl: 3    ALLERGIES:   Review of patient's allergies indicates:  No Known Allergies     X-ray Knee Ortho Right with Flexion  Narrative: EXAMINATION:  XR KNEE ORTHO RIGHT WITH FLEXION    CLINICAL HISTORY:  Pain in right knee    TECHNIQUE:  AP standing as well as PA flexion standing and Merchant views of both knees were performed.  A lateral view of the right knee is also performed.    COMPARISON:  None.    FINDINGS:  Right knee:    No fracture.  Question minimal medial compartmental narrowing.  Preserved lateral and patellofemoral compartments.  Minimal spurring about tibial spines and patellofemoral compartment.  Large suprapatellar bursal effusion.  Clinical correlation.  No prepatellar soft tissue swelling.    Left knee:    No fracture.  Question minimal medial compartmental narrowing.  Preserved lateral and patellofemoral compartments.  Minimal spurring about the posterior  "patella.  No prepatellar soft tissue swelling.  Impression: As above    Electronically signed by: Fareed Fang  Date:    10/10/2023  Time:    10:05      PHYSICAL EXAMINATION:  /85   Pulse 60   Ht 5' 9" (1.753 m)   Wt 90 kg (198 lb 6.6 oz)   BMI 29.30 kg/m²   Vitals signs and nursing note have been reviewed.    General: In no acute distress, well developed, well nourished, no diaphoresis  Eyes: EOM full and smooth, no eye redness or discharge  HEENT: normocephalic and atraumatic, neck supple, trachea midline, no nasal discharge  Cardiovascular: no LE edema  Lungs: respirations non-labored, no conversational dyspnea   Neuro: AAOx3, CN2-12 grossly intact  Skin: No rashes, warm and dry  Psychiatric: cooperative, pleasant, mood and affect appropriate for age    Right Knee:   Gait: wnl    Inspection/Palpation:   -Rubor   -Calor  +Effusion   +Patella ballotable   -Patellar apprehension  -Retinacular tenderness   -Patellar crepitus   Patellar tilt grossly normal     TTP at:  +Joint line   -MCL   -LCL   -Popliteal region   -Quad tendon   -Patella  -Pat tendon  -Pat border  -Med condyle   -Lat condyle   -Pes   -Prox fibula   -Tib tub  -Gerdy's tubercle  -Distal Hamstring tendons  -Proximal Hamstrings/Ischial tuberosity  -ITB    ROM:   Ext: 0°   Flex:140°   Popliteal Angle: 20°   -Discomfort w/ full flex   -Bounce-home discomfort     Ligamentous:   -Ant drawer   -Post drawer   -Lachman's   Good endpoints & no pain w/ valgus & varus stress    Meniscal:  -Jaylen's   -Saji   -Thessaly   -Pain w/ squat     Other:  -Patellar apprehension  -Patellar grind  -Nuñez's   -J sign  -Angelica's  Abductors 5/5    ASSESSMENT:      ICD-10-CM ICD-9-CM   1. Chronic pain of right knee  M25.561 719.46    G89.29 338.29   2. Effusion of bursa of right knee  M25.461 719.06   3. Primary osteoarthritis of right knee  M17.11 715.16         PLAN:  Based on patient history, physical exam findings, and imaging I do believe there might be a " degenerative process to the knee going on causing the effusion to accumulate.  Patient in no severe pain at this time.  He is not having to take medicine for his pain.  His biggest concern and complaint is a sniff this he feels in his knee.  We did offer to possibly aspirate the effusion to see if that improves his symptoms, but after further discussion, he is really able to deal with what is going on.  We will follow-up with patient should effusion continue to persist, he develops pain, or he develops instability.      Follow-up as needed.    All questions were answered to the best of my ability and all concerns were addressed at this time.      This note is dictated using the M*Modal Fluency Direct word recognition program. There are word recognition mistakes that are occasionally missed on review.

## 2023-12-15 ENCOUNTER — OFFICE VISIT (OUTPATIENT)
Dept: FAMILY MEDICINE | Facility: CLINIC | Age: 53
End: 2023-12-15
Payer: COMMERCIAL

## 2023-12-15 VITALS
DIASTOLIC BLOOD PRESSURE: 98 MMHG | OXYGEN SATURATION: 96 % | SYSTOLIC BLOOD PRESSURE: 146 MMHG | TEMPERATURE: 100 F | HEART RATE: 79 BPM | BODY MASS INDEX: 28.81 KG/M2 | WEIGHT: 195.13 LBS

## 2023-12-15 DIAGNOSIS — J06.9 URTI (ACUTE UPPER RESPIRATORY INFECTION): Primary | ICD-10-CM

## 2023-12-15 LAB
INFLUENZA A, MOLECULAR: DETECTED
INFLUENZA B, MOLECULAR: NOT DETECTED
RSV AG BY MOLECULAR METHOD: NOT DETECTED
SARS-COV-2 RNA RESP QL NAA+PROBE: NOT DETECTED

## 2023-12-15 PROCEDURE — 3077F SYST BP >= 140 MM HG: CPT | Mod: CPTII,S$GLB,, | Performed by: INTERNAL MEDICINE

## 2023-12-15 PROCEDURE — 0241U SARS-COV2 (COVID) WITH FLU/RSV BY PCR: CPT | Performed by: INTERNAL MEDICINE

## 2023-12-15 PROCEDURE — 3080F PR MOST RECENT DIASTOLIC BLOOD PRESSURE >= 90 MM HG: ICD-10-PCS | Mod: CPTII,S$GLB,, | Performed by: INTERNAL MEDICINE

## 2023-12-15 PROCEDURE — 1159F MED LIST DOCD IN RCRD: CPT | Mod: CPTII,S$GLB,, | Performed by: INTERNAL MEDICINE

## 2023-12-15 PROCEDURE — 99213 OFFICE O/P EST LOW 20 MIN: CPT | Mod: S$GLB,,, | Performed by: INTERNAL MEDICINE

## 2023-12-15 PROCEDURE — 3077F PR MOST RECENT SYSTOLIC BLOOD PRESSURE >= 140 MM HG: ICD-10-PCS | Mod: CPTII,S$GLB,, | Performed by: INTERNAL MEDICINE

## 2023-12-15 PROCEDURE — 3080F DIAST BP >= 90 MM HG: CPT | Mod: CPTII,S$GLB,, | Performed by: INTERNAL MEDICINE

## 2023-12-15 PROCEDURE — 1159F PR MEDICATION LIST DOCUMENTED IN MEDICAL RECORD: ICD-10-PCS | Mod: CPTII,S$GLB,, | Performed by: INTERNAL MEDICINE

## 2023-12-15 PROCEDURE — 99213 PR OFFICE/OUTPT VISIT, EST, LEVL III, 20-29 MIN: ICD-10-PCS | Mod: S$GLB,,, | Performed by: INTERNAL MEDICINE

## 2023-12-15 PROCEDURE — 99999 PR PBB SHADOW E&M-EST. PATIENT-LVL III: ICD-10-PCS | Mod: PBBFAC,,, | Performed by: INTERNAL MEDICINE

## 2023-12-15 PROCEDURE — 3008F PR BODY MASS INDEX (BMI) DOCUMENTED: ICD-10-PCS | Mod: CPTII,S$GLB,, | Performed by: INTERNAL MEDICINE

## 2023-12-15 PROCEDURE — 3008F BODY MASS INDEX DOCD: CPT | Mod: CPTII,S$GLB,, | Performed by: INTERNAL MEDICINE

## 2023-12-15 PROCEDURE — 99999 PR PBB SHADOW E&M-EST. PATIENT-LVL III: CPT | Mod: PBBFAC,,, | Performed by: INTERNAL MEDICINE

## 2023-12-15 RX ORDER — GUAIFENESIN 1200 MG/1
1 TABLET, EXTENDED RELEASE ORAL 2 TIMES DAILY PRN
Qty: 20 TABLET | Refills: 0 | Status: SHIPPED | OUTPATIENT
Start: 2023-12-15 | End: 2023-12-25

## 2023-12-15 NOTE — PROGRESS NOTES
Chief Complaint: Cough (Wednesday, cough kicked in), Nasal Congestion, and Fever (Wednesday and Thursday)      Rigoberto Sanchez  is a 53 y.o. year old patient who presents today for URTI    Came in with daughter and wife. Daughter had symptoms then he started having sx on Tuesday. Including coughing, rhinnorhea and congestion. Also has fevers. Got worse on Wedn and Thursday. Has taken dayquil, nyquil and mucinex with relief.    History reviewed. No pertinent surgical history.     Family History   Problem Relation Age of Onset    Diabetes Mother     No Known Problems Father         Social History     Socioeconomic History    Marital status:    Tobacco Use    Smoking status: Every Day     Types: Cigars   Substance and Sexual Activity    Alcohol use: No    Sexual activity: Yes     Partners: Female   Social History Narrative    .  One child.  Smoker.  Occup:  Multi .  ETOH:  Occasionally.           Current Outpatient Medications:     meloxicam (MOBIC) 15 MG tablet, Take 1 tablet (15 mg total) by mouth once daily., Disp: 30 tablet, Rfl: 1    guaiFENesin (MUCINEX) 1,200 mg Ta12, Take 1 tablet by mouth 2 (two) times daily as needed (congestion)., Disp: 20 tablet, Rfl: 0    ondansetron (ZOFRAN-ODT) 8 MG TbDL, DISSOLVE 1 T PO Q 8 H PRN, Disp: , Rfl: 1    oxyCODONE-acetaminophen (PERCOCET) 5-325 mg per tablet, Take 1 tablet by mouth every 4 (four) hours as needed for Pain. (Patient not taking: Reported on 11/2/2023), Disp: 25 tablet, Rfl: 0    tamsulosin (FLOMAX) 0.4 mg Cp24, Take 1 capsule (0.4 mg total) by mouth once daily. (Patient not taking: Reported on 12/15/2023), Disp: 10 capsule, Rfl: 0    tretinoin (RETIN-A) 0.025 % gel, Apply small amount to face qhs. Wash off qam and apply sunscreen. (Patient not taking: Reported on 11/2/2023), Disp: 45 g, Rfl: 3     Review of Systems   Constitutional:  Positive for fever. Negative for malaise/fatigue.   HENT:  Positive for congestion and sinus pain.  Negative for ear pain and sore throat.    Respiratory:  Positive for cough and sputum production. Negative for shortness of breath and wheezing.    Cardiovascular:  Negative for chest pain.   Gastrointestinal:  Negative for nausea.   Musculoskeletal:  Negative for myalgias.   Neurological:  Positive for headaches.        Objective:      Vitals:    12/15/23 1100   BP: (!) 146/98   Pulse:    Temp:        Physical Exam  Vitals and nursing note reviewed.   Constitutional:       Appearance: Normal appearance.   HENT:      Head: Normocephalic and atraumatic.      Right Ear: There is impacted cerumen.      Left Ear: There is impacted cerumen.      Nose: Congestion and rhinorrhea present.      Mouth/Throat:      Mouth: Mucous membranes are moist.      Pharynx: Posterior oropharyngeal erythema present.   Cardiovascular:      Rate and Rhythm: Normal rate and regular rhythm.   Pulmonary:      Effort: Pulmonary effort is normal.      Breath sounds: Normal breath sounds. No wheezing or rales.   Skin:     General: Skin is warm and dry.   Neurological:      General: No focal deficit present.      Mental Status: He is alert and oriented to person, place, and time.   Psychiatric:         Mood and Affect: Mood normal.         Behavior: Behavior normal.          Assessment:       1. URTI (acute upper respiratory infection)          Plan:   1. URTI (acute upper respiratory infection)  Assessment & Plan:  Acute, unresolved. Refer to HPI     - flu panel   - counseled patient to continue wearing mask and practicing hand hygiene and social distancing until his results come back and preferably until his symptoms resolve. Counseled to quarantine for 5-8 days from positive result.       Orders:  -     guaiFENesin (MUCINEX) 1,200 mg Ta12; Take 1 tablet by mouth 2 (two) times daily as needed (congestion).  Dispense: 20 tablet; Refill: 0  -     SARS-Cov2 (COVID) with FLU/RSV by PCR     Provided GoodRx coupon for mucinex    No follow-ups on  file.

## 2023-12-15 NOTE — ASSESSMENT & PLAN NOTE
Acute, unresolved. Refer to HPI     - flu panel   - counseled patient to continue wearing mask and practicing hand hygiene and social distancing until his results come back and preferably until his symptoms resolve. Counseled to quarantine for 5-8 days from positive result.

## 2023-12-15 NOTE — PROGRESS NOTES
Health Maintenance Due   Topic     Hepatitis C Screening  Consult pcp    Pneumococcal Vaccines (Age 0-64) (1 - PCV)     HIV Screening  Consult pcp    TETANUS VACCINE      Colorectal Cancer Screening  Consult pcp    Shingles Vaccine (1 of 2)     Influenza Vaccine (1)     COVID-19 Vaccine (2 - 2023-24 season) Not offered at this office